# Patient Record
Sex: MALE | Race: WHITE | Employment: OTHER | URBAN - METROPOLITAN AREA
[De-identification: names, ages, dates, MRNs, and addresses within clinical notes are randomized per-mention and may not be internally consistent; named-entity substitution may affect disease eponyms.]

---

## 2019-05-21 ENCOUNTER — PREP FOR PROCEDURE (OUTPATIENT)
Dept: SURGERY | Age: 64
End: 2019-05-21

## 2019-05-21 RX ORDER — SODIUM CHLORIDE 0.9 % (FLUSH) 0.9 %
10 SYRINGE (ML) INJECTION EVERY 12 HOURS SCHEDULED
Status: CANCELLED | OUTPATIENT
Start: 2019-05-21

## 2019-05-21 RX ORDER — SODIUM CHLORIDE 9 MG/ML
INJECTION, SOLUTION INTRAVENOUS CONTINUOUS
Status: CANCELLED | OUTPATIENT
Start: 2019-05-21

## 2019-05-31 NOTE — PROGRESS NOTES
[facility] PRE-ADMISSION TESTING ENDOSCOPY INSTRUCTIONS- PAT-phone number:[phone]    ENDOSCOPY INSTRUCTIONS:   [x] Bowel prep instructions reviewed. [x] Nothing by mouth after midnight, including gum, candy, mints, or water. [x] You may brush your teeth, gargle, but do NOT swallow water. [x] Do not wear makeup, lotions, powders, deodorant. Nail polish as directed by the nurse. [x] Arrange transportation to and from the hospital.  Arrange for someone to be with you for the remainder of the day due to having had anesthesia. PARKING INSTRUCTIONS:   [x] Arrival Time:__0930______________________  · [x] Parking lot  \"I\" OR 1 is located on [facility] (the corner of [facility]). To enter, press the button and the gate will lift. A free token will be provided to exit the lot. One car per patient is allowed to park in this lot. All other cars are to park on [address] [place] [address] either in the parking garage or the handicap lot. [] To reach the [facility] from [address] [place] [address], upon entering the hospital, take elevator B to the 3rd floor. EDUCATION INSTRUCTIONS:  [] Bring a complete list of your medications, please write the last time you took the medicine, give this list to the nurse.  [] Take the following medications the morning of surgery with 1-2 ounces of water:   [] Stop herbal supplements and vitamins 5 days before your surgery. [] DO NOT take any diabetic medicine the morning of surgery. Follow instructions for insulin the day before surgery. [] If you are diabetic and your blood sugar is low or you feel symptomatic, you may drink 1-2 ounces of apple juice or take a glucose tablet. The morning of your procedure, you may call the pre-op area if you have concerns about your blood sugar [phone]. [] Use your inhalers the morning of surgery. Bring your emergency inhaler with you day of surgery.   [] Follow physician instructions regarding any blood thinners you may be taking. WHAT TO EXPECT:  [x] The day of your procedure you will be greeted and checked in by the [facility].  In addition, you will be registered in the [facility] by a Patient Access Representative. Please bring your photo ID and insurance card. A nurse will greet you in accordance to the time you are needed in the pre-op area to prepare you for surgery. Please do not be discouraged if you are not greeted in the order you arrive as there are many variables that are involved in patient preparation. Your patience is greatly appreciated as you wait for your nurse. Please bring in items such as: books, magazines, newspapers, electronics, or any other items  to occupy your time in the waiting area. []  Delays may occur. Staff will make a sincere effort to keep you informed of delays. If any delays occur with your procedure, we apologize ahead of time for your inconvenience as we recognize the value of your time.

## 2019-06-06 ENCOUNTER — ANESTHESIA EVENT (OUTPATIENT)
Dept: ENDOSCOPY | Age: 64
End: 2019-06-06
Payer: COMMERCIAL

## 2019-06-06 ENCOUNTER — HOSPITAL ENCOUNTER (OUTPATIENT)
Age: 64
Setting detail: OUTPATIENT SURGERY
Discharge: HOME | End: 2019-06-06
Attending: SURGERY | Admitting: SURGERY
Payer: COMMERCIAL

## 2019-06-06 ENCOUNTER — ANESTHESIA (OUTPATIENT)
Dept: ENDOSCOPY | Age: 64
End: 2019-06-06
Payer: COMMERCIAL

## 2019-06-06 VITALS
WEIGHT: 195 LBS | TEMPERATURE: 97.8 F | DIASTOLIC BLOOD PRESSURE: 70 MMHG | RESPIRATION RATE: 16 BRPM | HEART RATE: 61 BPM | OXYGEN SATURATION: 95 % | SYSTOLIC BLOOD PRESSURE: 142 MMHG | HEIGHT: 69 IN | BODY MASS INDEX: 28.88 KG/M2

## 2019-06-06 VITALS
SYSTOLIC BLOOD PRESSURE: 95 MMHG | RESPIRATION RATE: 19 BRPM | DIASTOLIC BLOOD PRESSURE: 50 MMHG | OXYGEN SATURATION: 98 %

## 2019-06-06 PROCEDURE — 3700000001 HC ADD 15 MINUTES (ANESTHESIA): Performed by: SURGERY

## 2019-06-06 PROCEDURE — 2709999900 HC NON-CHARGEABLE SUPPLY: Performed by: SURGERY

## 2019-06-06 PROCEDURE — 3700000000 HC ANESTHESIA ATTENDED CARE: Performed by: SURGERY

## 2019-06-06 PROCEDURE — 7100000011 HC PHASE II RECOVERY - ADDTL 15 MIN: Performed by: SURGERY

## 2019-06-06 PROCEDURE — 7100000010 HC PHASE II RECOVERY - FIRST 15 MIN: Performed by: SURGERY

## 2019-06-06 PROCEDURE — 3609010600 HC COLONOSCOPY POLYPECTOMY SNARE/COLD BIOPSY: Performed by: SURGERY

## 2019-06-06 PROCEDURE — 6360000002 HC RX W HCPCS

## 2019-06-06 PROCEDURE — 88305 TISSUE EXAM BY PATHOLOGIST: CPT

## 2019-06-06 PROCEDURE — 2580000003 HC RX 258: Performed by: SURGERY

## 2019-06-06 RX ORDER — LABETALOL HYDROCHLORIDE 5 MG/ML
10 INJECTION, SOLUTION INTRAVENOUS EVERY 10 MIN PRN
Status: DISCONTINUED | OUTPATIENT
Start: 2019-06-06 | End: 2019-06-06 | Stop reason: HOSPADM

## 2019-06-06 RX ORDER — PROMETHAZINE HYDROCHLORIDE 25 MG/ML
12.5 INJECTION, SOLUTION INTRAMUSCULAR; INTRAVENOUS
Status: DISCONTINUED | OUTPATIENT
Start: 2019-06-06 | End: 2019-06-06 | Stop reason: HOSPADM

## 2019-06-06 RX ORDER — SODIUM CHLORIDE 0.9 % (FLUSH) 0.9 %
10 SYRINGE (ML) INJECTION EVERY 12 HOURS SCHEDULED
Status: DISCONTINUED | OUTPATIENT
Start: 2019-06-06 | End: 2019-06-06 | Stop reason: HOSPADM

## 2019-06-06 RX ORDER — SODIUM CHLORIDE 9 MG/ML
INJECTION, SOLUTION INTRAVENOUS CONTINUOUS
Status: DISCONTINUED | OUTPATIENT
Start: 2019-06-06 | End: 2019-06-06 | Stop reason: HOSPADM

## 2019-06-06 RX ORDER — MORPHINE SULFATE 2 MG/ML
1 INJECTION, SOLUTION INTRAMUSCULAR; INTRAVENOUS EVERY 5 MIN PRN
Status: DISCONTINUED | OUTPATIENT
Start: 2019-06-06 | End: 2019-06-06 | Stop reason: HOSPADM

## 2019-06-06 RX ORDER — PROPOFOL 10 MG/ML
INJECTION, EMULSION INTRAVENOUS PRN
Status: DISCONTINUED | OUTPATIENT
Start: 2019-06-06 | End: 2019-06-06 | Stop reason: SDUPTHER

## 2019-06-06 RX ORDER — 0.9 % SODIUM CHLORIDE 0.9 %
10 VIAL (ML) INJECTION PRN
Status: DISCONTINUED | OUTPATIENT
Start: 2019-06-06 | End: 2019-06-06 | Stop reason: HOSPADM

## 2019-06-06 RX ADMIN — PROPOFOL 380 MG: 10 INJECTION, EMULSION INTRAVENOUS at 10:22

## 2019-06-06 RX ADMIN — SODIUM CHLORIDE: 9 INJECTION, SOLUTION INTRAVENOUS at 10:07

## 2019-06-06 ASSESSMENT — PAIN SCALES - GENERAL
PAINLEVEL_OUTOF10: 0

## 2019-06-06 ASSESSMENT — PAIN - FUNCTIONAL ASSESSMENT: PAIN_FUNCTIONAL_ASSESSMENT: 0-10

## 2019-06-06 NOTE — OP NOTE
Pre op Dx:  Screening    Post Op Dx:  Polyp of hepatic flexure    Procedure:  Colonoscopy and polypectomy. Dictated.     Osorio Pierre MD FACS

## 2019-06-06 NOTE — ANESTHESIA POSTPROCEDURE EVALUATION
Department of Anesthesiology  Postprocedure Note    Patient: Scott Harden  MRN: [id number]  YOB: 1955  Date of evaluation: 6/6/2019  Time:  11:13 AM     Procedure Summary     Date:  06/06/19 Room / Location:  [facility] ENDO 01 / [facility] ENDOSCOPY    Anesthesia Start:  1019 Anesthesia Stop:  6304    Procedure:  COLONOSCOPY POLYPECTOMY SNARE/COLD BIOPSY (N/A ) Diagnosis:  (SCREENING)    Surgeon:  Isaac Ng MD Responsible Provider:  Zackery Medina MD    Anesthesia Type:  MAC ASA Status:  1          Anesthesia Type: MAC    Shreya Phase I: Shreya Score: 10    Shreya Phase II: Shreya Score: 10    Last vitals: Reviewed and per EMR flowsheets.        Anesthesia Post Evaluation    Patient location during evaluation: PACU  Patient participation: complete - patient participated  Level of consciousness: awake  Airway patency: patent  Nausea & Vomiting: no nausea and no vomiting  Complications: no  Cardiovascular status: hemodynamically stable  Respiratory status: acceptable  Hydration status: euvolemic

## 2019-06-06 NOTE — H&P
Fredrick Curtis MD   Physician   General Surgery   H&P   Signed   Encounter Date:  5/21/2019          Related encounter: Prep for Case from 5/21/2019 in [facility]  [facility]         Signed                 Show:Clear all  []Manual[]Template[x]Copied    Added by:  Sparkle Thomson MD      []Laiver for details      SUBJECTIVE  CHIEF COMPLAINT  New pt to the office, here today to discuss a screening colonoscopy, no previous scope. He is haing no problems. HISTORY OF PRESENT ILLNESS:  patient is a very pleasant 75 field gentleman here today for his first screening colonoscopy. He has no symptoms related to his lower G. I. tract and otherwise is completely healthy and takes no medications.     Review of systems  Constitutional: negative for chills, diaphoresis and fever  HEENT: negative for congestion, ear discharge, ear pain, hearing loss, nosebleeds and tinnitus. Eyes: negative for photophobia, pain and discharge. Respiratory: negative for shortness of breath and cough. Cardiovascular: negative for palpitations, chest pain and leg swelling. Gastrointestinal: no issues  endocrine: negative for polydypsia. Genitourinary: negative for frequency, hematuria and urgency. Musculoskeletal: negative for back and neck pain. Skin: negative for rash. Allergy/immunologic: negative for environmental allergies. Neurological: negative for tremors and seizures. Psychiatric/behavioral: negative for hallucinations and suicidal ideas.        EXAM:  Vital signs as noted. Skin is warm and dry  Psych: mood and affect normal. Alert and oriented times three. Constitutional: no apparent distress, comfortable. HEENT: unremarkable  Neck: thyroid is midline and there is no lymphadenopathy. No supraclavicular, axillary, or inguinal lymphadenopathy. Abdomen: soft and nontender. No masses. Musculoskeletal: normal gait. No obvious deformities. No cyanosis. Groin rectal: deferred.     MEDICAL INFORMATION REVIEWED  Allergies, Family History,  Habits,  Medical History,  Medications,  Social History,  Surgical History,  Vital Signs,  Attachments,  Screening & Prevention,  Problem List.    VITAL SIGNS  Date: 05- at 8:24 AM,  T: 98 F (Temporal), P:  64 (Regular), BP: 141/78 mmHg (taken on right while sitting), Ht: 5 ft 9 in, Wt: 201 lb , BMI: 29.68 (50%),  Oxygen Saturation: 94%            IMPRESSION  Discussed with the patient the purpose of a colonoscopy in this situation. The goal is to find and remove polyps, and to identify and biopsy mucosal abnormalities if any. Main potential complication is perforation in one in 1000 cases and the possibility of bleeding if a polyp is removed. Went over the prep involved. Patient understands and would like to proceed. We will make arrangements. DIAGNOSES  (Z12.11)  Encounter screening malignant neoplasm of colon    Seen and examined today. No changes.      Rajiv Rodriguez MD FACS                      Routing History

## 2019-06-06 NOTE — PROCEDURES
[address]                  [address] [facility], [address] [place] [address]                                 PROCEDURE NOTE    PATIENT NAME: Francisco Hernandes                     :        1955  MED REC NO:   [id number]                            ROOM:  ACCOUNT NO:   [de-identified]                           ADMIT DATE: 2019  PROVIDER:     Azucena Rasheed    DATE OF PROCEDURE:  2019    PROCEDURE PERFORMED:  Total colonoscopy and polypectomy. INDICATIONS:  A 68-year gentleman who presents for screening  colonoscopy. FINDINGS:  He had a polyp in the hepatic flexure that was removed with a  snare and some sigmoid diverticulosis; otherwise, normal colonoscopy. DESCRIPTION OF PROCEDURE:  The patient was brought to [place] [address] [facility] as an outpatient. He had a mag citrate prep. Sedation  was provided by the anesthesia department. He was then placed in left  lateral decubitus position. Digital rectal exam was then performed  which showed good anal tone. No hemorrhoids. Prostate was smooth and  not enlarged. Videocolonoscope was then inserted into the anus,  advanced to rectum. There was some sigmoid diverticulosis and at the  hepatic flexure, there was a polyp that was removed using a snare and  sucked out through the biopsy channel. The scope was advanced all the  way into the top of the cecum. Bowel prep was good and no other  abnormalities were identified on the way in and I pulled the scope back. Total of 8 minutes were taken to remove the scope. No other  abnormalities were found. Scope was removed and the procedure was  considered terminated. RECOMMENDATION AND FINDINGS:  We will await the path report and based on  that I will make further recommendations. Helen Patel    D: 2019 10:46:04       T: 2019 15:49:46     [email or web address]  Job#: [id number]     Doc#: [id number]    CC:   Terrence Huang MD

## 2019-06-06 NOTE — ANESTHESIA PRE PROCEDURE
Department of Anesthesiology  Preprocedure Note       Name:  Seth Garcia   Age:  75 y.o.  :  1955                                          MRN:  [id number]         Date:  2019      Surgeon: Con Ceron):  Rasheeda Ruano MD    Procedure: COLORECTAL CANCER SCREENING, NOT HIGH RISK (N/A )    Medications prior to admission:   Prior to Admission medications    Not on File       Current medications:    Current Facility-Administered Medications   Medication Dose Route Frequency Provider Last Rate Last Dose    0.9 % sodium chloride infusion   Intravenous Continuous Rasheeda Ruano MD        sodium chloride (PF) 0.9 % injection 10 mL  10 mL Intravenous PRN Rasheeda Ruano MD        sodium chloride flush 0.9 % injection 10 mL  10 mL Intravenous 2 times per day Rasheeda Ruano MD           Allergies:  No Known Allergies    Problem List:  There is no problem list on file for this patient. Past Medical History:  History reviewed. No pertinent past medical history.     Past Surgical History:        Procedure Laterality Date    TONSILLECTOMY         Social History:    Social History     Tobacco Use    Smoking status: Never Smoker    Smokeless tobacco: Never Used   Substance Use Topics    Alcohol use: No     Comment: 2 BEER/WEEK                                Counseling given: Not Answered      Vital Signs (Current):   Vitals:    19 1555 19 0950   BP:  (!) 142/90   Pulse:  74   Resp:  20   Temp:  36.6 °C (97.8 °F)   TempSrc:  Temporal   SpO2:  97%   Weight: 195 lb (88.5 kg) 195 lb (88.5 kg)   Height: 5' 9\" (1.753 m) 5' 9\" (1.753 m)                                              BP Readings from Last 3 Encounters:   19 (!) 142/90   16 131/77       NPO Status:  Last solid food 19   Bowel prep liquid 2300 19                                                                               BMI:   Wt Readings from Last 3 Encounters:   19 195 lb (88.5 kg) with patient. Use of blood products discussed with patient whom consented to blood products. Plan discussed with CRNA and attending.                   Edward Mendieta RN   6/6/2019

## 2023-03-13 ENCOUNTER — HOSPITAL ENCOUNTER (OUTPATIENT)
Age: 68
Discharge: HOME | End: 2023-03-13
Payer: MEDICARE

## 2023-03-13 LAB — PROSTATE SPECIFIC ANTIGEN: 3.91 NG/ML (ref 0–4)

## 2023-03-13 PROCEDURE — 36415 COLL VENOUS BLD VENIPUNCTURE: CPT

## 2023-03-13 PROCEDURE — 84153 ASSAY OF PSA TOTAL: CPT

## 2025-04-15 ENCOUNTER — HOSPITAL ENCOUNTER (OUTPATIENT)
Dept: RADIOLOGY | Facility: HOSPITAL | Age: 70
Discharge: HOME | End: 2025-04-15
Payer: MEDICARE

## 2025-04-15 DIAGNOSIS — E78.2 MIXED HYPERLIPIDEMIA: ICD-10-CM

## 2025-04-15 PROCEDURE — 75571 CT HRT W/O DYE W/CA TEST: CPT

## 2025-07-02 ENCOUNTER — OFFICE VISIT (OUTPATIENT)
Dept: CARDIOLOGY | Facility: HOSPITAL | Age: 70
End: 2025-07-02
Payer: MEDICARE

## 2025-07-02 VITALS
DIASTOLIC BLOOD PRESSURE: 86 MMHG | WEIGHT: 190.26 LBS | OXYGEN SATURATION: 99 % | SYSTOLIC BLOOD PRESSURE: 152 MMHG | HEART RATE: 55 BPM

## 2025-07-02 DIAGNOSIS — E78.5 HYPERLIPIDEMIA, UNSPECIFIED HYPERLIPIDEMIA TYPE: ICD-10-CM

## 2025-07-02 DIAGNOSIS — R06.02 SHORTNESS OF BREATH: ICD-10-CM

## 2025-07-02 DIAGNOSIS — I10 HYPERTENSION, UNSPECIFIED TYPE: Primary | ICD-10-CM

## 2025-07-02 LAB
ATRIAL RATE: 49 BPM
P AXIS: 36 DEGREES
P OFFSET: 187 MS
P ONSET: 145 MS
PR INTERVAL: 154 MS
Q ONSET: 222 MS
QRS COUNT: 8 BEATS
QRS DURATION: 80 MS
QT INTERVAL: 420 MS
QTC CALCULATION(BAZETT): 379 MS
QTC FREDERICIA: 392 MS
R AXIS: 10 DEGREES
T AXIS: 22 DEGREES
T OFFSET: 432 MS
VENTRICULAR RATE: 49 BPM

## 2025-07-02 PROCEDURE — 1159F MED LIST DOCD IN RCRD: CPT | Performed by: INTERNAL MEDICINE

## 2025-07-02 PROCEDURE — 93005 ELECTROCARDIOGRAM TRACING: CPT | Performed by: INTERNAL MEDICINE

## 2025-07-02 PROCEDURE — 93010 ELECTROCARDIOGRAM REPORT: CPT | Performed by: INTERNAL MEDICINE

## 2025-07-02 PROCEDURE — 99204 OFFICE O/P NEW MOD 45 MIN: CPT | Performed by: INTERNAL MEDICINE

## 2025-07-02 PROCEDURE — 99212 OFFICE O/P EST SF 10 MIN: CPT

## 2025-07-02 PROCEDURE — 3079F DIAST BP 80-89 MM HG: CPT | Performed by: INTERNAL MEDICINE

## 2025-07-02 PROCEDURE — 3077F SYST BP >= 140 MM HG: CPT | Performed by: INTERNAL MEDICINE

## 2025-07-02 RX ORDER — ATORVASTATIN CALCIUM 20 MG/1
20 TABLET, FILM COATED ORAL DAILY
COMMUNITY

## 2025-07-02 ASSESSMENT — ENCOUNTER SYMPTOMS
NEUROLOGICAL NEGATIVE: 1
PSYCHIATRIC NEGATIVE: 1
CARDIOVASCULAR NEGATIVE: 1
HEMATOLOGIC/LYMPHATIC NEGATIVE: 1
EYES NEGATIVE: 1
GASTROINTESTINAL NEGATIVE: 1
RESPIRATORY NEGATIVE: 1
CONSTITUTIONAL NEGATIVE: 1
MUSCULOSKELETAL NEGATIVE: 1
ENDOCRINE NEGATIVE: 1

## 2025-07-02 NOTE — PATIENT INSTRUCTIONS
NUCLEAR STRESS TEST   WILL CALL TO REVIEW THE RESULTS   CONTINUE ASPIRIN 81 MG DAILY   CALL WITH ANY QUESTIONS

## 2025-07-02 NOTE — PROGRESS NOTES
Referred by Dr. Aviles for No chief complaint on file.     History Of Present Illness:    I had the pleasure of meeting Mr. Lyles today at [facility] for evaluation of an elevated CT calcium score. The patient is seen in collaboration with Dr. Aviles. Mr. Lyles is a very pleasant 70 year old gentleman with a history of HLD, denies history of smoking. Denies family history of CAD. Recent CT calcium score is 444. He denies chest pain, shortness of breath or heart palpitations. States he is very active, walks on a regular basis. Currently in a study for COVID 19 vaccine.     Review of Systems   Constitutional: Negative.   HENT: Negative.     Eyes: Negative.    Cardiovascular: Negative.    Respiratory: Negative.     Endocrine: Negative.    Hematologic/Lymphatic: Negative.    Skin: Negative.    Musculoskeletal: Negative.    Gastrointestinal: Negative.    Neurological: Negative.    Psychiatric/Behavioral: Negative.        Past Medical History:  He has no past medical history on file.    Past Surgical History:  He has no past surgical history on file.      Social History:  He reports that he has never smoked. He has never used smokeless tobacco. He reports that he does not drink alcohol and does not use drugs.    Family History:  Family History[1]     Allergies:  Patient has no allergy information on record.    Outpatient Medications:  Current Outpatient Medications   Medication Instructions    atorvastatin (LIPITOR) 20 mg, oral, Daily        Last Recorded Vitals:  Vitals:    07/02/25 1107   BP: 152/86   Pulse: 55   SpO2: 99%   Weight: 86.3 kg (190 lb 4.1 oz)       Physical Exam:  Physical Exam  Vitals reviewed.   HENT:      Head: Normocephalic.      Nose: Nose normal.   Eyes:      Pupils: Pupils are equal, round, and reactive to light.   Cardiovascular:      Rate and Rhythm: Normal rate and regular rhythm.   Pulmonary:      Effort: Pulmonary effort is normal.      Breath sounds: Normal breath  "sounds.   Abdominal:      General: Abdomen is flat.      Palpations: Abdomen is soft.   Musculoskeletal:         General: Normal range of motion.      Cervical back: Normal range of motion.   Skin:     General: Skin is warm and dry.   Neurological:      General: No focal deficit present.      Mental Status: He is alert and oriented to person, place, and time.   Psychiatric:         Mood and Affect: Mood normal.             Last Labs:  CBC -  No results found for: \"WBC\", \"HGB\", \"HCT\", \"MCV\", \"PLT\"    CMP -  No results found for: \"CALCIUM\", \"PHOS\", \"PROT\", \"ALBUMIN\", \"AST\", \"ALT\", \"ALKPHOS\", \"BILITOT\"    LIPID PANEL -   No results found for: \"CHOL\", \"TRIG\", \"HDL\", \"CHHDL\", \"LDLF\", \"VLDL\", \"NHDL\"    RENAL FUNCTION PANEL -   No results found for: \"GLUCOSE\", \"NA\", \"K\", \"CL\", \"CO2\", \"ANIONGAP\", \"BUN\", \"CREATININE\", \"GFRMALE\", \"CALCIUM\", \"PHOS\", \"ALBUMIN\"     No results found for: \"BNP\", \"HGBA1C\"    Last Cardiology Tests:  ECG:  EKG independently reviewed from today showed sinus bradycardia heart rate 49 bpm   Echo:    Ejection Fractions:    Cath:    Stress Test:    Cardiac Imaging:  CT cardiac scoring wo IV contrast 04/15/2025  LM 6.55,  .94,  LCx 26.99,  RCA 21.88,      Total 444.36    Assessment/Plan   Mr. Lyles is a very pleasant 70 year old gentleman with a history of HLD, recent CT calcium score 444. He will be set up for a nuclear stress test . Heart rate and blood pressure is well controlled today     Plan   -call with any questions   -continue Aspirin 81 mg daily   -continue Atorvastatin 20 mg daily   -nuclear stress test   -will call to review the results     I appreciate the opportunity to participate in the patient's care, please call with any questions           Nahomy Cavanaugh, APRN-CNP       [1] No family history on file.    "

## 2025-07-09 ENCOUNTER — HOSPITAL ENCOUNTER (OUTPATIENT)
Dept: RADIOLOGY | Facility: HOSPITAL | Age: 70
Discharge: HOME | End: 2025-07-09
Payer: MEDICARE

## 2025-07-09 ENCOUNTER — HOSPITAL ENCOUNTER (OUTPATIENT)
Dept: CARDIOLOGY | Facility: HOSPITAL | Age: 70
Discharge: HOME | End: 2025-07-09
Payer: MEDICARE

## 2025-07-09 DIAGNOSIS — R06.02 SHORTNESS OF BREATH: ICD-10-CM

## 2025-07-09 PROCEDURE — 3430000001 HC RX 343 DIAGNOSTIC RADIOPHARMACEUTICALS: Performed by: NURSE PRACTITIONER

## 2025-07-09 PROCEDURE — 78452 HT MUSCLE IMAGE SPECT MULT: CPT | Performed by: RADIOLOGY

## 2025-07-09 PROCEDURE — 93017 CV STRESS TEST TRACING ONLY: CPT

## 2025-07-09 PROCEDURE — 93018 CV STRESS TEST I&R ONLY: CPT | Performed by: INTERNAL MEDICINE

## 2025-07-09 PROCEDURE — A9502 TC99M TETROFOSMIN: HCPCS | Performed by: NURSE PRACTITIONER

## 2025-07-09 PROCEDURE — 78452 HT MUSCLE IMAGE SPECT MULT: CPT

## 2025-07-09 PROCEDURE — 93016 CV STRESS TEST SUPVJ ONLY: CPT | Performed by: INTERNAL MEDICINE

## 2025-07-09 RX ADMIN — TETROFOSMIN 33.5 MILLICURIE: 0.23 INJECTION, POWDER, LYOPHILIZED, FOR SOLUTION INTRAVENOUS at 11:30

## 2025-07-09 RX ADMIN — TETROFOSMIN 10.8 MILLICURIE: 0.23 INJECTION, POWDER, LYOPHILIZED, FOR SOLUTION INTRAVENOUS at 10:12

## 2025-07-13 LAB
ATRIAL RATE: 49 BPM
P AXIS: 36 DEGREES
P OFFSET: 187 MS
P ONSET: 145 MS
PR INTERVAL: 154 MS
Q ONSET: 222 MS
QRS COUNT: 8 BEATS
QRS DURATION: 80 MS
QT INTERVAL: 420 MS
QTC CALCULATION(BAZETT): 379 MS
QTC FREDERICIA: 392 MS
R AXIS: 10 DEGREES
T AXIS: 22 DEGREES
T OFFSET: 432 MS
VENTRICULAR RATE: 49 BPM

## (undated) DEVICE — TRAP POLYP ETRAP

## (undated) DEVICE — GAUZE,SPONGE,POST-OP,4X3,STRL,LF: Brand: MEDLINE

## (undated) DEVICE — DEFENDO AIR WATER SUCTION AND BIOPSY VALVE KIT FOR  OLYMPUS: Brand: DEFENDO AIR/WATER/SUCTION AND BIOPSY VALVE

## (undated) DEVICE — CONTAINER SPEC 60ML PH 7NEUTRAL BUFF FRMLN RDY TO USE

## (undated) DEVICE — SNARE ENDOSCP M L240CM LOOP W27MM SHTH DIA2.4MM OVL FLX

## (undated) DEVICE — CONNECTOR TBNG AUX H2O JET DISP FOR OLY 160/180 SER

## (undated) DEVICE — PATIENT RETURN ELECTRODE, SINGLE-USE, CONTACT QUALITY MONITORING, ADULT, WITH 9FT CORD, FOR PATIENTS WEIGING OVER 33LBS. (15KG): Brand: MEGADYNE

## (undated) DEVICE — CANNULA NSL ORAL AD FOR CAPNOFLEX CO2 O2 AIRLFE